# Patient Record
Sex: MALE | Race: WHITE | NOT HISPANIC OR LATINO | Employment: FULL TIME | URBAN - METROPOLITAN AREA
[De-identification: names, ages, dates, MRNs, and addresses within clinical notes are randomized per-mention and may not be internally consistent; named-entity substitution may affect disease eponyms.]

---

## 2022-12-15 PROBLEM — IMO0002 UNCONTROLLED TYPE 2 DIABETES MELLITUS: Status: ACTIVE | Noted: 2018-11-12

## 2022-12-15 PROBLEM — R55 SYNCOPE: Status: ACTIVE | Noted: 2022-11-19

## 2022-12-15 PROBLEM — E78.2 MIXED HYPERLIPIDEMIA: Status: ACTIVE | Noted: 2018-11-12

## 2022-12-15 PROBLEM — E66.9 OBESITY: Status: ACTIVE | Noted: 2022-12-15

## 2022-12-15 PROBLEM — I10 BENIGN ESSENTIAL HYPERTENSION: Status: ACTIVE | Noted: 2018-11-12

## 2022-12-15 RX ORDER — LISINOPRIL 20 MG/1
1 TABLET ORAL DAILY
COMMUNITY

## 2022-12-15 RX ORDER — ATORVASTATIN CALCIUM 20 MG/1
1 TABLET, FILM COATED ORAL NIGHTLY
COMMUNITY

## 2022-12-15 RX ORDER — METFORMIN HYDROCHLORIDE 500 MG/1
2 TABLET, FILM COATED, EXTENDED RELEASE ORAL DAILY
COMMUNITY
Start: 2017-09-29

## 2022-12-15 NOTE — PROGRESS NOTES
Encounter Date:12/16/2022      Patient ID: Kobe Truong is a 47 y.o. male.    Chief Complaint   Patient presents with   • Consult   • Syncope          History of Present Illness    Kobe is a 47-year-old with past medical history of hypertension, diabetes, hyperlipidemia, and family history of premature coronary artery disease who presents as a new patient consult for syncope.  He said a few weeks ago he was sitting in a restaurant with his family in a [place] and had just finished eating.  His wife notes that she saw him slumped down with some slight twitching.  She initially thought he was just looking down at the ground for something.  But then she noticed that he was not responding to her.  He was unarousable.  She felt for pulse but could not locate 1 and so they started to move him to the ground when he woke up.  She said this episode lasted about 1 minute.  When he woke up he was a little confused but otherwise no weakness or focal deficits noted.  His wife notes that he did appear grayish during this episode.  He then felt like it was going to happen again and felt a cool sensation in his neck going up to his head.  However he did not lose consciousness again and he was taken to the ER where work-up was negative.  CT head was negative.  He is due to see a neurologist and has an MRI scheduled.    He denies any chest pain or shortness of breath.  His last HbA1c was 7.7.  No family history of sudden cardiac death but his dad did have an MI at the age of 50.  He used to be more active prior to COVID include play soccer and basketball but now has been less active in the last 2 years.    The following portions of the patient's history were reviewed and updated as appropriate: allergies, current medications, past family history, past medical history, past social history, past surgical history, and problem list.    Review of Systems   Constitutional: Negative for malaise/fatigue.   Cardiovascular: Positive for  syncope. Negative for chest pain, dyspnea on exertion, leg swelling and palpitations.   Respiratory: Negative for cough and shortness of breath.    Gastrointestinal: Negative for abdominal pain, nausea and vomiting.   Neurological: Positive for headaches. Negative for dizziness, focal weakness, light-headedness and numbness.   All other systems reviewed and are negative.        Current Outpatient Medications:   •  atorvastatin (LIPITOR) 20 MG tablet, Take 1 tablet by mouth Every Night., Disp: , Rfl:   •  glimepiride (AMARYL) 4 MG tablet, Take 4 mg by mouth Daily., Disp: , Rfl:   •  lisinopril (PRINIVIL,ZESTRIL) 20 MG tablet, Take 1 tablet by mouth Daily., Disp: , Rfl:   •  metFORMIN (GLUMETZA) 500 MG (MOD) 24 hr tablet, Take 2 tablets by mouth Daily., Disp: , Rfl:     Allergies   Allergen Reactions   • Penicillins Hives       Family History   Problem Relation Age of Onset   • Diabetes Mother    • Heart disease Father    • Hypertension Father    • Diabetes Father    • Heart attack Father        History reviewed. No pertinent surgical history.    Past Medical History:   Diagnosis Date   • Diabetes mellitus (HCC)    • GERD (gastroesophageal reflux disease)    • Hypertension 11/12/2018   • Pulmonary embolism (HCC) 11/12/2018       Social History     Socioeconomic History   • Marital status:    Tobacco Use   • Smoking status: Never   • Smokeless tobacco: Never   Vaping Use   • Vaping Use: Never used   Substance and Sexual Activity   • Alcohol use: Yes     Alcohol/week: 6.0 standard drinks     Types: 6 Cans of beer per week   • Drug use: Yes     Types: Marijuana   • Sexual activity: Yes     Partners: Female           ECG 12 Lead    Date/Time: 12/16/2022 10:34 AM  Performed by: Abbi Farr MD  Authorized by: Abbi Farr MD   Comparison: not compared with previous ECG   Previous ECG: no previous ECG available  Rhythm: sinus rhythm  Rate: normal  BPM: 67  Conduction: conduction normal  QRS axis:  "normal    Clinical impression: normal ECG              Objective:       Physical Exam    /89   Pulse 66   Ht 175.3 cm (69\")   Wt 104 kg (230 lb)   SpO2 96%   BMI 33.97 kg/m²   The patient is alert, oriented and in no distress.    Vital signs as noted above.    Head and neck revealed no carotid bruits or jugular venous distension.  No thyromegaly or lymphadenopathy is present.    Lungs clear.  No wheezing.  Breath sounds are normal bilaterally.    Heart normal first and second heart sounds.  No murmur..  No pericardial rub is present.  No gallop is present.    Abdomen soft and nontender.  No organomegaly is present.    Extremities revealed good peripheral pulses without any pedal edema.    Skin warm and dry.    Musculoskeletal system is grossly normal.    CNS grossly normal.           Diagnosis Plan   1. Syncope and collapse  Mobile Cardiac Outpatient Telemetry    Adult Transthoracic Echo Complete W/ Color, Spectral and Contrast if Necessary Per Protocol      2. Mixed hyperlipidemia        3. Type 2 diabetes mellitus without complication, without long-term current use of insulin ([facility])        4. Primary hypertension        LAB RESULTS (LAST 7 DAYS)    CBC        BMP        CMP         BNP        TROPONIN        CoAg        Creatinine Clearance  CrCl cannot be calculated (No successful lab value found.).    ABG        Radiology  No radiology results for the last day         Assessment and Plan       Diagnoses and all orders for this visit:    1. Syncope and collapse (Primary)  -     Mobile Cardiac Outpatient Telemetry; Future  -     Adult Transthoracic Echo Complete W/ Color, Spectral and Contrast if Necessary Per Protocol; Future    2. Mixed hyperlipidemia    3. Type 2 diabetes mellitus without complication, without long-term current use of insulin (HCC)    4. Primary hypertension    Other orders  -     ECG 12 Lead    Syncope  No prodromal symptoms  This is his first episode  EKG within normal limits  Blood " work within normal limits at the ER  We will obtain echocardiogram  30-day M cot  He has an appointment with neurology set up  MRI is pending    Diabetes  HbA1c is more than 7  Uncontrolled  Currently on metformin    Hypertension  Well-controlled at home  Systolic blood pressure is usually 120s  Continue with lisinopril    Hyperlipidemia  Atorvastatin 20 mg  Goal LDL less than 70    Family history of premature coronary artery disease  No chest pain currently  Risk factor modification      Abbi Farr MD

## 2022-12-16 ENCOUNTER — OFFICE VISIT (OUTPATIENT)
Dept: CARDIOLOGY | Facility: CLINIC | Age: 47
End: 2022-12-16

## 2022-12-16 VITALS
SYSTOLIC BLOOD PRESSURE: 140 MMHG | HEART RATE: 66 BPM | BODY MASS INDEX: 34.07 KG/M2 | HEIGHT: 69 IN | OXYGEN SATURATION: 96 % | WEIGHT: 230 LBS | DIASTOLIC BLOOD PRESSURE: 89 MMHG

## 2022-12-16 DIAGNOSIS — R55 SYNCOPE AND COLLAPSE: Primary | ICD-10-CM

## 2022-12-16 DIAGNOSIS — E78.2 MIXED HYPERLIPIDEMIA: ICD-10-CM

## 2022-12-16 DIAGNOSIS — E11.9 TYPE 2 DIABETES MELLITUS WITHOUT COMPLICATION, WITHOUT LONG-TERM CURRENT USE OF INSULIN: ICD-10-CM

## 2022-12-16 DIAGNOSIS — I10 PRIMARY HYPERTENSION: ICD-10-CM

## 2022-12-16 PROCEDURE — 93000 ELECTROCARDIOGRAM COMPLETE: CPT | Performed by: INTERNAL MEDICINE

## 2022-12-16 PROCEDURE — 99204 OFFICE O/P NEW MOD 45 MIN: CPT | Performed by: INTERNAL MEDICINE

## 2022-12-16 RX ORDER — GLIMEPIRIDE 4 MG/1
4 TABLET ORAL DAILY
COMMUNITY
Start: 2022-11-26

## 2023-01-06 ENCOUNTER — TRANSCRIBE ORDERS (OUTPATIENT)
Dept: ADMINISTRATIVE | Facility: HOSPITAL | Age: 48
End: 2023-01-06
Payer: COMMERCIAL

## 2023-01-06 DIAGNOSIS — G40.309: Primary | ICD-10-CM

## 2023-01-12 ENCOUNTER — HOSPITAL ENCOUNTER (OUTPATIENT)
Dept: CARDIOLOGY | Facility: HOSPITAL | Age: 48
Discharge: HOME | End: 2023-01-12
Admitting: INTERNAL MEDICINE
Payer: COMMERCIAL

## 2023-01-12 DIAGNOSIS — R55 SYNCOPE AND COLLAPSE: ICD-10-CM

## 2023-01-12 LAB
BH CV ECHO MEAS - ACS: 2.03 CM
BH CV ECHO MEAS - AO MAX PG: 6.2 MMHG
BH CV ECHO MEAS - AO MEAN PG: 3.2 MMHG
BH CV ECHO MEAS - AO ROOT DIAM: 3.2 CM
BH CV ECHO MEAS - AO V2 MAX: 124.1 CM/SEC
BH CV ECHO MEAS - AO V2 VTI: 21.8 CM
BH CV ECHO MEAS - AVA(I,D): 2.7 CM2
BH CV ECHO MEAS - EDV(CUBED): 115.1 ML
BH CV ECHO MEAS - EDV(MOD-SP4): 79.6 ML
BH CV ECHO MEAS - EF(MOD-BP): 54 %
BH CV ECHO MEAS - EF(MOD-SP4): 54.2 %
BH CV ECHO MEAS - ESV(CUBED): 42.4 ML
BH CV ECHO MEAS - ESV(MOD-SP4): 36.5 ML
BH CV ECHO MEAS - FS: 28.3 %
BH CV ECHO MEAS - IVS/LVPW: 0.94 CM
BH CV ECHO MEAS - IVSD: 0.85 CM
BH CV ECHO MEAS - LA DIMENSION: 3.5 CM
BH CV ECHO MEAS - LV DIASTOLIC VOL/BSA (35-75): 36.3 CM2
BH CV ECHO MEAS - LV MASS(C)D: 146.1 GRAMS
BH CV ECHO MEAS - LV MAX PG: 2.6 MMHG
BH CV ECHO MEAS - LV MEAN PG: 1.71 MMHG
BH CV ECHO MEAS - LV SYSTOLIC VOL/BSA (12-30): 16.6 CM2
BH CV ECHO MEAS - LV V1 MAX: 81 CM/SEC
BH CV ECHO MEAS - LV V1 VTI: 16.5 CM
BH CV ECHO MEAS - LVIDD: 4.9 CM
BH CV ECHO MEAS - LVIDS: 3.5 CM
BH CV ECHO MEAS - LVOT AREA: 3.6 CM2
BH CV ECHO MEAS - LVOT DIAM: 2.15 CM
BH CV ECHO MEAS - LVPWD: 0.91 CM
BH CV ECHO MEAS - MV A MAX VEL: 54.9 CM/SEC
BH CV ECHO MEAS - MV DEC SLOPE: 252.4 CM/SEC2
BH CV ECHO MEAS - MV DEC TIME: 0.28 MSEC
BH CV ECHO MEAS - MV E MAX VEL: 69.9 CM/SEC
BH CV ECHO MEAS - MV E/A: 1.27
BH CV ECHO MEAS - MV MAX PG: 1.79 MMHG
BH CV ECHO MEAS - MV MEAN PG: 1.07 MMHG
BH CV ECHO MEAS - MV V2 VTI: 18.5 CM
BH CV ECHO MEAS - MVA(VTI): 3.2 CM2
BH CV ECHO MEAS - PA ACC TIME: 0.16 SEC
BH CV ECHO MEAS - PA PR(ACCEL): 7.4 MMHG
BH CV ECHO MEAS - PA V2 MAX: 115.4 CM/SEC
BH CV ECHO MEAS - PI END-D VEL: 71.8 CM/SEC
BH CV ECHO MEAS - PULM A REVS DUR: 0.09 SEC
BH CV ECHO MEAS - PULM A REVS VEL: 25.4 CM/SEC
BH CV ECHO MEAS - PULM DIAS VEL: 49.5 CM/SEC
BH CV ECHO MEAS - PULM S/D: 0.82
BH CV ECHO MEAS - PULM SYS VEL: 40.4 CM/SEC
BH CV ECHO MEAS - RAP SYSTOLE: 8 MMHG
BH CV ECHO MEAS - RV MAX PG: 0.84 MMHG
BH CV ECHO MEAS - RV V1 MAX: 45.7 CM/SEC
BH CV ECHO MEAS - RV V1 VTI: 9.3 CM
BH CV ECHO MEAS - RVDD: 3.8 CM
BH CV ECHO MEAS - SI(MOD-SP4): 19.7 ML/M2
BH CV ECHO MEAS - SV(LVOT): 59.7 ML
BH CV ECHO MEAS - SV(MOD-SP4): 43.2 ML
MAXIMAL PREDICTED HEART RATE: 173 BPM
STRESS TARGET HR: 147 BPM

## 2023-01-12 PROCEDURE — 93306 TTE W/DOPPLER COMPLETE: CPT

## 2023-01-12 PROCEDURE — 93306 TTE W/DOPPLER COMPLETE: CPT | Performed by: INTERNAL MEDICINE

## 2023-01-25 ENCOUNTER — HOSPITAL ENCOUNTER (OUTPATIENT)
Dept: NEUROLOGY | Facility: HOSPITAL | Age: 48
Discharge: HOME | End: 2023-01-25
Admitting: PSYCHIATRY & NEUROLOGY
Payer: COMMERCIAL

## 2023-01-25 DIAGNOSIS — G40.309: ICD-10-CM

## 2023-01-25 PROCEDURE — 95819 EEG AWAKE AND ASLEEP: CPT

## 2023-01-25 PROCEDURE — 95819 EEG AWAKE AND ASLEEP: CPT | Performed by: PSYCHIATRY & NEUROLOGY

## 2023-12-22 ENCOUNTER — OFFICE VISIT (OUTPATIENT)
Dept: CARDIOLOGY | Facility: CLINIC | Age: 48
End: 2023-12-22
Payer: COMMERCIAL

## 2023-12-22 VITALS
WEIGHT: 229 LBS | BODY MASS INDEX: 33.92 KG/M2 | DIASTOLIC BLOOD PRESSURE: 84 MMHG | OXYGEN SATURATION: 97 % | HEIGHT: 69 IN | HEART RATE: 71 BPM | SYSTOLIC BLOOD PRESSURE: 123 MMHG

## 2023-12-22 DIAGNOSIS — E78.2 MIXED HYPERLIPIDEMIA: ICD-10-CM

## 2023-12-22 DIAGNOSIS — I10 PRIMARY HYPERTENSION: ICD-10-CM

## 2023-12-22 DIAGNOSIS — R55 SYNCOPE AND COLLAPSE: Primary | ICD-10-CM

## 2023-12-22 DIAGNOSIS — E11.9 TYPE 2 DIABETES MELLITUS WITHOUT COMPLICATION, WITHOUT LONG-TERM CURRENT USE OF INSULIN: ICD-10-CM

## 2023-12-22 RX ORDER — LISINOPRIL 40 MG/1
40 TABLET ORAL DAILY
Status: SHIPPED | COMMUNITY
Start: 2023-12-22 | End: 2023-12-22 | Stop reason: SDUPTHER

## 2023-12-22 RX ORDER — ASPIRIN 81 MG/1
81 TABLET ORAL DAILY
Qty: 90 TABLET | Refills: 3 | Status: SHIPPED | OUTPATIENT
Start: 2023-12-22

## 2023-12-22 RX ORDER — LISINOPRIL 40 MG/1
40 TABLET ORAL DAILY
Qty: 90 TABLET | Refills: 3 | Status: SHIPPED | OUTPATIENT
Start: 2023-12-22

## 2023-12-22 NOTE — PROGRESS NOTES
Encounter Date:12/16/2022      Patient ID: Kobe Truong is a 48 y.o. male.    Chief Complaint   Patient presents with    Hypertension    Hyperlipidemia          History of Present Illness    Kobe is a 47-year-old with past medical history of hypertension, diabetes, hyperlipidemia, and syncope who presents for follow-up.  He has been doing well since I last saw him.  No further syncopal episodes.  Monitor was unrevealing  Echocardiogram was within normal limits.  Denies any chest pain or shortness of breath.  No dizziness or lightheadedness.  He did have an MRI which was unrevealing and neurologist thought he may have had a nonconvulsive seizure.  He does mention that his diabetes is poorly controlled right now so they did increase his metformin.  He also brings a blood pressure log and at home he has had some readings in the 140s to 150s systolic.  He has a mild headache with this.      I reviewed his lipid panel from his PCP and his LDL was 50.    Previous history:  He denies any chest pain or shortness of breath.  His last HbA1c was 7.7.  No family history of sudden cardiac death but his dad did have an MI at the age of 50.  He used to be more active prior to COVID include play soccer and basketball but now has been less active in the last 2 years.    The following portions of the patient's history were reviewed and updated as appropriate: allergies, current medications, past family history, past medical history, past social history, past surgical history, and problem list.    Review of Systems   Constitutional: Negative for malaise/fatigue.   Cardiovascular:  Positive for syncope. Negative for chest pain, dyspnea on exertion, leg swelling and palpitations.   Respiratory:  Negative for cough and shortness of breath.    Gastrointestinal:  Negative for abdominal pain, nausea and vomiting.   Neurological:  Positive for headaches. Negative for dizziness, focal weakness, light-headedness and numbness.   All other  "systems reviewed and are negative.        Current Outpatient Medications:     atorvastatin (LIPITOR) 20 MG tablet, Take 1 tablet by mouth Every Night., Disp: , Rfl:     glimepiride (AMARYL) 4 MG tablet, Take 1 tablet by mouth Daily., Disp: , Rfl:     lisinopril (PRINIVIL,ZESTRIL) 40 MG tablet, Take 1 tablet by mouth Daily., Disp: 90 tablet, Rfl: 3    metFORMIN (GLUMETZA) 500 MG (MOD) 24 hr tablet, Take 2 tablets by mouth 2 (Two) Times a Day With Meals., Disp: , Rfl:     aspirin 81 MG EC tablet, Take 1 tablet by mouth Daily., Disp: 90 tablet, Rfl: 3    Allergies   Allergen Reactions    Penicillins Hives       Family History   Problem Relation Age of Onset    Diabetes Mother     Heart disease Father     Hypertension Father     Diabetes Father     Heart attack Father        History reviewed. No pertinent surgical history.    Past Medical History:   Diagnosis Date    Diabetes mellitus     GERD (gastroesophageal reflux disease)     Hypertension 11/12/2018    Mixed hyperlipidemia 11/12/2018    Pulmonary embolism 11/12/2018       Social History     Socioeconomic History    Marital status:    Tobacco Use    Smoking status: Never    Smokeless tobacco: Never   Vaping Use    Vaping Use: Never used   Substance and Sexual Activity    Alcohol use: Yes     Alcohol/week: 6.0 standard drinks of alcohol     Types: 6 Cans of beer per week    Drug use: Not Currently     Types: Marijuana    Sexual activity: Yes     Partners: Female         Procedures      Objective:       Physical Exam    /84 (BP Location: Left arm, Patient Position: Sitting, Cuff Size: Large Adult)   Pulse 71   Ht 175.3 cm (69\")   Wt 104 kg (229 lb)   SpO2 97%   BMI 33.82 kg/m²   The patient is alert, oriented and in no distress.    Vital signs as noted above.    Head and neck revealed no carotid bruits or jugular venous distension.  No thyromegaly or lymphadenopathy is present.    Lungs clear.  No wheezing.  Breath sounds are normal " bilaterally.    Heart normal first and second heart sounds.  No murmur..  No pericardial rub is present.  No gallop is present.    Abdomen soft and nontender.  No organomegaly is present.    Extremities revealed good peripheral pulses without any pedal edema.    Skin warm and dry.    Musculoskeletal system is grossly normal.    CNS grossly normal.           Diagnosis Plan   1. Syncope and collapse        2. Mixed hyperlipidemia        3. Type 2 diabetes mellitus without complication, without long-term current use of insulin        4. Primary hypertension          LAB RESULTS (LAST 7 DAYS)    CBC        BMP        CMP         BNP        TROPONIN        CoAg        Creatinine Clearance  CrCl cannot be calculated (No successful lab value found.).    ABG        Radiology  No radiology results for the last day         Assessment and Plan       Diagnoses and all orders for this visit:    1. Syncope and collapse (Primary)    2. Mixed hyperlipidemia    3. Type 2 diabetes mellitus without complication, without long-term current use of insulin    4. Primary hypertension    Other orders  -     lisinopril (PRINIVIL,ZESTRIL) 40 MG tablet; Take 1 tablet by mouth Daily.  Dispense: 90 tablet; Refill: 3  -     aspirin 81 MG EC tablet; Take 1 tablet by mouth Daily.  Dispense: 90 tablet; Refill: 3      Syncope  Resolved with no further episodes  Echo and monitor within normal limits    Diabetes  HbA1c is more than 7  Uncontrolled  Currently on metformin and recently dose was increased    Hypertension  Increase lisinopril to 40 mg    Hyperlipidemia  Atorvastatin 20 mg  LDL is 50    Family history of premature coronary artery disease  No chest pain currently  Risk factor modification      Abbi Farr MD

## 2024-12-31 RX ORDER — LISINOPRIL 40 MG/1
40 TABLET ORAL DAILY
Qty: 90 TABLET | Refills: 3 | OUTPATIENT
Start: 2024-12-31

## 2024-12-31 NOTE — TELEPHONE ENCOUNTER
Rx Refill Note  Requested Prescriptions     Refused Prescriptions Disp Refills    lisinopril (PRINIVIL,ZESTRIL) 40 MG tablet 90 tablet 3     Sig: Take 1 tablet by mouth Daily.     Refused By: MARIANA PATEL     Reason for Refusal: Patient no longer under prescriber care      Last office visit with prescribing clinician: 12/22/2023   Last telemedicine visit with prescribing clinician: Visit date not found   Next office visit with prescribing clinician: Visit date not found                         Would you like a call back once the refill request has been completed: [] Yes [] No    If the office needs to give you a call back, can they leave a voicemail: [] Yes [] No    Mariana Patel MA  12/31/24, 09:24 EST

## 2025-03-19 ENCOUNTER — OFFICE VISIT (OUTPATIENT)
Dept: CARDIOLOGY | Facility: CLINIC | Age: 50
End: 2025-03-19
Payer: COMMERCIAL

## 2025-03-19 VITALS
DIASTOLIC BLOOD PRESSURE: 90 MMHG | OXYGEN SATURATION: 96 % | BODY MASS INDEX: 31.1 KG/M2 | HEIGHT: 69 IN | WEIGHT: 210 LBS | RESPIRATION RATE: 18 BRPM | SYSTOLIC BLOOD PRESSURE: 143 MMHG | HEART RATE: 65 BPM

## 2025-03-19 DIAGNOSIS — E78.2 MIXED HYPERLIPIDEMIA: ICD-10-CM

## 2025-03-19 DIAGNOSIS — Z00.00 PREVENTATIVE HEALTH CARE: Primary | ICD-10-CM

## 2025-03-19 RX ORDER — ASPIRIN 81 MG/1
81 TABLET ORAL DAILY
Qty: 90 TABLET | Refills: 3 | Status: SHIPPED | OUTPATIENT
Start: 2025-03-19

## 2025-03-19 RX ORDER — LISINOPRIL 40 MG/1
40 TABLET ORAL DAILY
Qty: 90 TABLET | Refills: 3 | Status: SHIPPED | OUTPATIENT
Start: 2025-03-19

## 2025-03-19 RX ORDER — ATORVASTATIN CALCIUM 20 MG/1
20 TABLET, FILM COATED ORAL NIGHTLY
Qty: 90 TABLET | Refills: 3 | Status: SHIPPED | OUTPATIENT
Start: 2025-03-19

## 2025-03-19 NOTE — PROGRESS NOTES
"Cardiology Clinic Note  Yfn Sánchez MD, PhD    Subjective:     Encounter Date:03/19/2025      Patient ID: Kobe Truong is a 49 y.o. male.    Chief Complaint:  Chief Complaint   Patient presents with    Loss of Consciousness       HPI:    I the pleasure to see this 49-year-old gentleman with a history of syncope otherwise normal cardiac structure and function by echo 2023, no prior history of CV events, no chest pain shortness of breath anginal symptoms, no excess volume otherwise works as a  without issues.  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  He is diabetic on metformin, is not on any diuretics, is on lisinopril for blood pressure, no beta-blockers, he is on aspirin and statin therapy.  We discussed calcium scoring, he had 2 events more than a year and 1/2 to 2 years apart.  He had low likelihood of catching anything on heart monitor and if this happens again we discussed a plan loop recorder for which she is agreeable.  Discussed primary prevention goals, diet exercise heart healthy lifestyle today    Review of systems otherwise negative x 14 point review of systems except as mentioned above    Historical data copied forward from previous encounters in EMR including the history, exam, and assessment/plan has been reviewed and is unchanged unless noted otherwise.    Cardiac medicines reviewed with risk, benefits, and necessity of each discussed.    Risk and benefit of cardiac testing reviewed including death heart attack stroke pain bleeding infection need for vascular /cardiovascular surgery were discussed and the patient     Objective:         /90 (BP Location: Right arm, Patient Position: Sitting)   Pulse 65   Resp 18   Ht 175.3 cm (69\")   Wt 95.3 kg (210 lb)   SpO2 96%   BMI 31.01 kg/m²     Physical Exam  Regular rate and rhythm no " rubs murmurs or gallops  No heave or lift    Cyanosis or edema  Normal CV exam  Assessment:       Independently manage medical conditions  History of syncope  Preventative health care  Hyperlipidemia  Hypertension  Diabetes is comorbidity affecting care  Diagnoses and all orders for this visit:    1. Preventative health care (Primary)  -     CT Cardiac Calcium Score Without Dye; Future    2. Mixed hyperlipidemia  -     CT Cardiac Calcium Score Without Dye; Future  -     atorvastatin (LIPITOR) 20 MG tablet; Take 1 tablet by mouth Every Night.  Dispense: 90 tablet; Refill: 3    Other orders  -     aspirin 81 MG EC tablet; Take 1 tablet by mouth Daily.  Dispense: 90 tablet; Refill: 3  -     lisinopril (PRINIVIL,ZESTRIL) 40 MG tablet; Take 1 tablet by mouth Daily.  Dispense: 90 tablet; Refill: 3            1 year follow-up    The pleasure to be involved in this patient's cardiovascular care.  Please call with any questions or concerns  Yfn Sánchez MD, PhD    Most recent EKG as reviewed and interpreted by me:    ECG 12 Lead    Date/Time: 3/19/2025 3:23 PM  Performed by: Yfn Sánchez MD    Authorized by: Yfn Sánchez MD  Comparison: not compared with previous ECG   Previous ECG: no previous ECG available  Rhythm: sinus rhythm  Rate: normal  Conduction: conduction normal  QRS axis: normal    Clinical impression: normal ECG           Most recent echo as reviewed and interpreted by me:  Results for orders placed during the hospital encounter of 01/12/23    Adult Transthoracic Echo Complete W/ Color, Spectral and Contrast if Necessary Per Protocol    Interpretation Summary    Left ventricular systolic function is normal. Calculated left ventricular EF = 54% Left ventricular ejection fraction appears to be 51 - 55%.    Left ventricular diastolic function was normal.    No significant valvular abnormalities.      Most recent stress test as reviewed and interpreted by me:      Most recent cardiac  catheterization as reviewed interpreted by me:  No results found for this or any previous visit.    The following portions of the patient's history were reviewed and updated as appropriate: allergies, current medications, past family history, past medical history, past social history, past surgical history, and problem list.      ROS:  14 point review of systems negative except as mentioned above    Current Outpatient Medications:     acetaminophen (TYLENOL) 500 MG tablet, Take 1 tablet by mouth Every 6 (Six) Hours As Needed for Mild Pain., Disp: , Rfl:     aspirin 81 MG EC tablet, Take 1 tablet by mouth Daily., Disp: 90 tablet, Rfl: 3    atorvastatin (LIPITOR) 20 MG tablet, Take 1 tablet by mouth Every Night., Disp: 90 tablet, Rfl: 3    lisinopril (PRINIVIL,ZESTRIL) 40 MG tablet, Take 1 tablet by mouth Daily., Disp: 90 tablet, Rfl: 3    metFORMIN (GLUMETZA) 500 MG (MOD) 24 hr tablet, Take 2 tablets by mouth 2 (Two) Times a Day With Meals., Disp: , Rfl:     Continuous Blood Gluc Sensor (FreeStyle Allyson 3 Sensor) misc, USE TO MONITOR BLOOD SUGAR. CHANGE EVERY 14 DAYS, Disp: , Rfl:     Problem List:  Patient Active Problem List   Diagnosis    Benign essential hypertension    Mixed hyperlipidemia    Obesity    Syncope    Uncontrolled type 2 diabetes mellitus     Past Medical History:  Past Medical History:   Diagnosis Date    Diabetes mellitus     GERD (gastroesophageal reflux disease)     Hypertension 11/12/2018    Mixed hyperlipidemia 11/12/2018    Pulmonary embolism 11/12/2018     Past Surgical History:  No past surgical history on file.  Social History:  Social History     Socioeconomic History    Marital status:    Tobacco Use    Smoking status: Never     Passive exposure: Never    Smokeless tobacco: Never   Vaping Use    Vaping status: Never Used   Substance and Sexual Activity    Alcohol use: Yes     Alcohol/week: 6.0 standard drinks of alcohol     Types: 6 Cans of beer per week    Drug use: Not Currently      Types: Marijuana    Sexual activity: Yes     Partners: Female     Allergies:  Allergies   Allergen Reactions    Penicillins Hives     Immunizations:  Immunization History   Administered Date(s) Administered    COVID-19 (PFIZER) Purple Cap Monovalent 03/17/2021, 04/07/2021, 11/23/2021    Flu Vaccine Quad PF 6-35MO 11/24/2018    Fluzone  >6mos 12/11/2017    Tdap 02/07/2022            In-Office Procedure(s):  No orders to display        ASCVD RIsk Score::  The ASCVD Risk score (Tia DK, et al., 2019) failed to calculate for the following reasons:    Cannot find a previous HDL lab    Cannot find a previous total cholesterol lab    Imaging:                 Lab Review:   No visits with results within 6 Month(s) from this visit.   Latest known visit with results is:   Admission on 02/26/2024, Discharged on 02/26/2024   Component Date Value    SARS Antigen 02/26/2024 Not Detected     Influenza A Antigen HOANG 02/26/2024 Not Detected     Influenza B Antigen HOANG 02/26/2024 Not Detected     Internal Control 02/26/2024 Passed     Lot Number 02/26/2024 [id number]     Expiration Date 02/26/2024 11/27/2024      Recent labs reviewed and interpreted for clinical significance and application            Level of Care:           Yfn Sánchez MD  03/19/25  .

## 2025-03-20 ENCOUNTER — PATIENT ROUNDING (BHMG ONLY) (OUTPATIENT)
Dept: CARDIOLOGY | Facility: CLINIC | Age: 50
End: 2025-03-20
Payer: COMMERCIAL

## 2025-03-31 ENCOUNTER — TRANSCRIBE ORDERS (OUTPATIENT)
Dept: ADMINISTRATIVE | Facility: HOSPITAL | Age: 50
End: 2025-03-31
Payer: COMMERCIAL

## 2025-03-31 DIAGNOSIS — Z13.6 SCREENING FOR ISCHEMIC HEART DISEASE: Primary | ICD-10-CM

## 2025-05-30 ENCOUNTER — HOSPITAL ENCOUNTER (OUTPATIENT)
Dept: CT IMAGING | Facility: HOSPITAL | Age: 50
Discharge: HOME | End: 2025-05-30

## 2025-05-30 ENCOUNTER — HOSPITAL ENCOUNTER (OUTPATIENT)
Dept: CARDIOLOGY | Facility: HOSPITAL | Age: 50
Discharge: HOME | End: 2025-05-30

## 2025-05-30 DIAGNOSIS — Z00.00 PREVENTATIVE HEALTH CARE: ICD-10-CM

## 2025-05-30 DIAGNOSIS — E78.2 MIXED HYPERLIPIDEMIA: ICD-10-CM

## 2025-05-30 DIAGNOSIS — Z13.6 SCREENING FOR ISCHEMIC HEART DISEASE: ICD-10-CM

## 2025-05-30 LAB
BH CV VAS SCREENING CAROTID CCA LEFT: 99 CM/SEC
BH CV VAS SCREENING CAROTID CCA RIGHT: 99 CM/SEC
BH CV VAS SCREENING CAROTID ICA LEFT: 80 CM/SEC
BH CV VAS SCREENING CAROTID ICA RIGHT: 103 CM/SEC
BH CV XLRA MEAS - MID AO DIAM: 2.1 CM
BH CV XLRA MEAS - PAD LEFT ABI PT: 1.19
BH CV XLRA MEAS - PAD LEFT ARM: 108 MMHG
BH CV XLRA MEAS - PAD LEFT LEG PT: 129 MMHG
BH CV XLRA MEAS - PAD RIGHT ABI PT: 1.24
BH CV XLRA MEAS - PAD RIGHT ARM: 107 MMHG
BH CV XLRA MEAS - PAD RIGHT LEG PT: 134 MMHG
BH CV XLRA MEAS LEFT DIST CCA EDV: -31.1 CM/SEC
BH CV XLRA MEAS LEFT DIST CCA PSV: -99.4 CM/SEC
BH CV XLRA MEAS LEFT ICA/CCA RATIO: 0.8
BH CV XLRA MEAS LEFT PROX ICA EDV: -29.2 CM/SEC
BH CV XLRA MEAS LEFT PROX ICA PSV: -79.5 CM/SEC
BH CV XLRA MEAS RIGHT DIST CCA EDV: -30.4 CM/SEC
BH CV XLRA MEAS RIGHT DIST CCA PSV: -98.8 CM/SEC
BH CV XLRA MEAS RIGHT ICA/CCA RATIO: 1
BH CV XLRA MEAS RIGHT PROX ICA EDV: -35.4 CM/SEC
BH CV XLRA MEAS RIGHT PROX ICA PSV: -103 CM/SEC

## 2025-05-30 PROCEDURE — 75571 CT HRT W/O DYE W/CA TEST: CPT

## 2025-05-30 PROCEDURE — 93799 UNLISTED CV SVC/PROCEDURE: CPT

## 2025-05-30 PROCEDURE — VASCULARSCN2 VASCULAR SCREENING (BUNDLE) CAR: Performed by: INTERNAL MEDICINE

## 2025-07-29 ENCOUNTER — OFFICE VISIT (OUTPATIENT)
Dept: PULMONOLOGY | Facility: HOSPITAL | Age: 50
End: 2025-07-29
Payer: COMMERCIAL

## 2025-07-29 VITALS
HEART RATE: 63 BPM | HEIGHT: 69 IN | WEIGHT: 200 LBS | OXYGEN SATURATION: 99 % | RESPIRATION RATE: 14 BRPM | SYSTOLIC BLOOD PRESSURE: 118 MMHG | BODY MASS INDEX: 29.62 KG/M2 | DIASTOLIC BLOOD PRESSURE: 78 MMHG

## 2025-07-29 DIAGNOSIS — I10 BENIGN ESSENTIAL HYPERTENSION: ICD-10-CM

## 2025-07-29 DIAGNOSIS — E78.2 MIXED HYPERLIPIDEMIA: ICD-10-CM

## 2025-07-29 DIAGNOSIS — R91.1 SOLITARY PULMONARY NODULE ON LUNG CT: Primary | ICD-10-CM

## 2025-07-29 PROCEDURE — G0463 HOSPITAL OUTPT CLINIC VISIT: HCPCS

## 2025-07-29 RX ORDER — LORATADINE 10 MG/1
10 TABLET ORAL DAILY
COMMUNITY

## 2025-07-29 RX ORDER — OMEPRAZOLE 20 MG/1
20 CAPSULE, DELAYED RELEASE ORAL DAILY
COMMUNITY

## 2025-07-29 NOTE — PROGRESS NOTES
HPI:  50 y.o.  Male patient here for pulmonary evaluation of left lower lobe lung nodule seen on cardiac scan May 2025  No shortness of breath, chest pain or hemoptysis    Past Medical History:   Diagnosis Date    Diabetes mellitus     GERD (gastroesophageal reflux disease)     Hypertension 11/12/2018    Lung nodule seen on imaging study     Mixed hyperlipidemia 11/12/2018    Pulmonary embolism 11/12/2018        Current Outpatient Medications on File Prior to Visit   Medication Sig Dispense Refill    acetaminophen (TYLENOL) 500 MG tablet Take 1 tablet by mouth Every 6 (Six) Hours As Needed for Mild Pain.      aspirin 81 MG EC tablet Take 1 tablet by mouth Daily. 90 tablet 3    atorvastatin (LIPITOR) 20 MG tablet Take 1 tablet by mouth Every Night. 90 tablet 3    Continuous Blood Gluc Sensor (FreeStyle Allyson 3 Sensor) misc USE TO MONITOR BLOOD SUGAR. CHANGE EVERY 14 DAYS      lisinopril (PRINIVIL,ZESTRIL) 40 MG tablet Take 1 tablet by mouth Daily. 90 tablet 3    loratadine (CLARITIN) 10 MG tablet Take 1 tablet by mouth Daily.      metFORMIN (GLUMETZA) 500 MG (MOD) 24 hr tablet Take 2 tablets by mouth 2 (Two) Times a Day With Meals.      omeprazole (priLOSEC) 20 MG capsule Take 1 capsule by mouth Daily.      glucose blood test strip 1 each by Other route As Needed.       No current facility-administered medications on file prior to visit.        Social History     Tobacco Use    Smoking status: Never     Passive exposure: Never    Smokeless tobacco: Never   Vaping Use    Vaping status: Never Used   Substance Use Topics    Alcohol use: Yes     Alcohol/week: 6.0 standard drinks of alcohol     Types: 6 Cans of beer per week    Drug use: Not Currently     Types: Marijuana        Family History   Problem Relation Age of Onset    Diabetes Mother     Heart disease Father     Hypertension Father     Diabetes Father     Heart attack Father         Review of system:  Constitutional: Negative for chills, fever and  "malaise/fatigue.   HENT: Negative.    Eyes: Negative.    Cardiovascular: Negative.    Respiratory: negative for cough and shortness of breath.    Skin: Negative.    Musculoskeletal: Negative.    Gastrointestinal: Negative.    Genitourinary: Negative.    Neurological: Negative.    Psychiatric/Behavioral: Negative.    Physical exam:  Height 175.3 cm (69\").    General Appearance:  Alert   HEENT:  Normocephalic, without obvious abnormality, Conjunctiva/corneas clear,.   Nares normal, no drainage     Neck:  Supple, symmetrical, trachea midline. No JVD.  Lungs /Chest wall:   good air entry Bilaterlly, respirations unlabored, symmetrical wall movement.     Heart:  Regular rate and rhythm, S1 S2 normal  Abdomen: Soft, non-tender, no masses, no organomegaly.    Extremities: No edema, no clubbing or cyanosis    CT Cardiac Calcium Score Without Dye  Result Date: 5/30/2025  Impression: Minimal plaque burden. 1.2 cm noncalcified pulmonary nodule in the left lower lobe. If patient is in a prior CT chest or CT abdomen pelvis for comparison that would be helpful. If not, recommend whole body PET/CT for characterization. Calcium Score Interpretation 0 -- Negative examination, no identifiable atherosclerotic plaque.  Very low risk of cardiac events in the next 5 years. 1-10 -- Minimal plaque burden.  Low risk of cardiac events in the next 5 years. 11- 100 -- Mild Plaque burden.  Mild risk of cardiac events in the next 5 years. 101 - 400 -- Moderate plaque burden.  Moderate risk of cardiac events in the next 5 years. Over 400 -- Extensive plaque burden.  High risk of cardiac events in the next 5 years. Electronically Signed: Amada Petersen MD  5/30/2025 2:28 PM EDT  Workstation ID: [id number]     Results for orders placed during the hospital encounter of 01/12/23    Adult Transthoracic Echo Complete W/ Color, Spectral and Contrast if Necessary Per Protocol 01/12/2023  6:00 PM    Interpretation Summary    Left ventricular systolic function " is normal. Calculated left ventricular EF = 54% Left ventricular ejection fraction appears to be 51 - 55%.    Left ventricular diastolic function was normal.    No significant valvular abnormalities.        Assessment :  Solitary lung nodule 1.2 cm noncalcified in left lower lobe on cardiac CT scan 5/30/2025 and again seen on CT scan of the chest done prior to radiology 6/20/2025  The patient is low risk for lung cancer since he does not smoke, no history of malignancy but he did have a grandfather who had lung cancer although that grandparent worked with asbestos    History of syncope in the past but no evidence of heart disease or carotid disease on CT scanning of the cardiac and vascular imaging  HTN      Plan:  Order PET scan and if positive then we will schedule robotic bronchoscopy for biopsy of the left lower lobe nodule  If the procedure is needed then we will stop the aspirin 5 days prior to the procedure    I personally reviewed the radiological images      Patient is advised to stay up-to-date on immunizations